# Patient Record
Sex: FEMALE | Race: WHITE
[De-identification: names, ages, dates, MRNs, and addresses within clinical notes are randomized per-mention and may not be internally consistent; named-entity substitution may affect disease eponyms.]

---

## 2020-09-24 ENCOUNTER — HOSPITAL ENCOUNTER (OUTPATIENT)
Dept: HOSPITAL 62 - OROUT | Age: 80
Discharge: HOME | End: 2020-09-24
Attending: SURGERY
Payer: MEDICARE

## 2020-09-24 VITALS — SYSTOLIC BLOOD PRESSURE: 161 MMHG | DIASTOLIC BLOOD PRESSURE: 70 MMHG

## 2020-09-24 DIAGNOSIS — E07.9: ICD-10-CM

## 2020-09-24 DIAGNOSIS — Z79.82: ICD-10-CM

## 2020-09-24 DIAGNOSIS — E78.00: ICD-10-CM

## 2020-09-24 DIAGNOSIS — Z03.818: ICD-10-CM

## 2020-09-24 DIAGNOSIS — M19.90: ICD-10-CM

## 2020-09-24 DIAGNOSIS — M10.9: ICD-10-CM

## 2020-09-24 DIAGNOSIS — Z79.899: ICD-10-CM

## 2020-09-24 DIAGNOSIS — I67.2: Primary | ICD-10-CM

## 2020-09-24 DIAGNOSIS — I10: ICD-10-CM

## 2020-09-24 PROCEDURE — 37609 LIGATION/BX TEMPORAL ARTERY: CPT

## 2020-09-24 PROCEDURE — 87635 SARS-COV-2 COVID-19 AMP PRB: CPT

## 2020-09-24 PROCEDURE — 88305 TISSUE EXAM BY PATHOLOGIST: CPT

## 2020-09-24 PROCEDURE — 00352 ANES PX MAJ VSL NCK SMPL LIG: CPT

## 2020-09-24 PROCEDURE — C9803 HOPD COVID-19 SPEC COLLECT: HCPCS

## 2020-09-24 NOTE — OPERATIVE REPORT
Nonrecallable Operative Report


DATE OF SURGERY: 09/24/20


PREOPERATIVE DIAGNOSIS: temporal arteritis


POSTOPERATIVE DIAGNOSIS: temporal arteritis


OPERATION: right temporal artery biopsy


SURGEON: YANG DUNNE


ANESTHESIA: Moderate Sedation


TISSUE REMOVED OR ALTERED: right temporal artery


COMPLICATIONS: 





none


ESTIMATED BLOOD LOSS: 2cc


INTRAOPERATIVE FINDINGS: see note


PROCEDURE: 





Patient was brought to the operating when awake alert stable condition placed in

the operative table supine position and given IV sedation.  The right 

preauricular area was prepped and draped in usual sterile fashion.





The temporal artery was palpated and then the skin over it was anesthetized with

1% lidocaine plain.  A curvilinear incision was made just in front of the right 

ear dissection was carried down through subcutaneous tissue with Metzenbaum 

scissors and then clamped dissection.





Once we identified the temporal artery a section of it approximately 1/2 cm was 

freed up with sharp dissection and was long with 2-0 silk sutures proximally and

distally there were both tied off and the artery was excised.  He was sent to 

pathology.  The subcutaneous tissue was reapproximated interrupted 3-0 Vicryl 

and skin was reapproximated intracuticular 4 oh bison we then used Dermabond for

the skin.





Patient tolerated the procedure well was transferred recovery in stable 

condition

## 2020-09-24 NOTE — DISCHARGE SUMMARY
Discharge Summary (SDC)





- Discharge


Final Diagnosis: 





temporal arteritis


Date of Surgery: 09/24/20


Discharge Date: 09/24/20


Condition: Good


Referrals: 


CELI RANDOLPH MD [Primary Care Provider] - 


Discharge Diet: As Tolerated


Discharge Activity: Activity As Tolerated


Report the Following to Your Physician Immediately: Shortness of Breath, Fever 

over 101 Degrees - appoint with me 7-10days.